# Patient Record
Sex: FEMALE | Race: OTHER | NOT HISPANIC OR LATINO
[De-identification: names, ages, dates, MRNs, and addresses within clinical notes are randomized per-mention and may not be internally consistent; named-entity substitution may affect disease eponyms.]

---

## 2019-08-09 ENCOUNTER — APPOINTMENT (OUTPATIENT)
Dept: ULTRASOUND IMAGING | Facility: HOSPITAL | Age: 64
End: 2019-08-09
Payer: COMMERCIAL

## 2019-08-09 ENCOUNTER — OUTPATIENT (OUTPATIENT)
Dept: OUTPATIENT SERVICES | Facility: HOSPITAL | Age: 64
LOS: 1 days | End: 2019-08-09
Payer: COMMERCIAL

## 2019-08-09 DIAGNOSIS — E04.2 NONTOXIC MULTINODULAR GOITER: ICD-10-CM

## 2019-08-09 DIAGNOSIS — M81.8 OTHER OSTEOPOROSIS WITHOUT CURRENT PATHOLOGICAL FRACTURE: ICD-10-CM

## 2019-08-09 PROCEDURE — 76536 US EXAM OF HEAD AND NECK: CPT | Mod: 26

## 2019-08-09 PROCEDURE — 76536 US EXAM OF HEAD AND NECK: CPT

## 2020-10-12 ENCOUNTER — INPATIENT (INPATIENT)
Facility: HOSPITAL | Age: 65
LOS: 2 days | Discharge: HOME | DRG: 674 | End: 2020-10-15
Attending: UROLOGY | Admitting: UROLOGY
Payer: COMMERCIAL

## 2020-10-12 VITALS
SYSTOLIC BLOOD PRESSURE: 95 MMHG | HEIGHT: 66 IN | HEART RATE: 105 BPM | DIASTOLIC BLOOD PRESSURE: 59 MMHG | RESPIRATION RATE: 18 BRPM | OXYGEN SATURATION: 96 % | WEIGHT: 130.07 LBS | TEMPERATURE: 101 F

## 2020-10-12 PROCEDURE — 93010 ELECTROCARDIOGRAM REPORT: CPT

## 2020-10-12 PROCEDURE — 99291 CRITICAL CARE FIRST HOUR: CPT

## 2020-10-13 DIAGNOSIS — K66.1 HEMOPERITONEUM: ICD-10-CM

## 2020-10-13 LAB
ALBUMIN SERPL ELPH-MCNC: 3.8 G/DL — SIGNIFICANT CHANGE UP (ref 3.3–5)
ALP SERPL-CCNC: 65 U/L — SIGNIFICANT CHANGE UP (ref 40–120)
ALT FLD-CCNC: 19 U/L — SIGNIFICANT CHANGE UP (ref 10–45)
ANION GAP SERPL CALC-SCNC: 13 MMOL/L — SIGNIFICANT CHANGE UP (ref 5–17)
APPEARANCE UR: ABNORMAL
APTT BLD: 26.9 SEC — LOW (ref 27.5–35.5)
AST SERPL-CCNC: 21 U/L — SIGNIFICANT CHANGE UP (ref 10–40)
BACTERIA # UR AUTO: ABNORMAL
BASOPHILS # BLD AUTO: 0 K/UL — SIGNIFICANT CHANGE UP (ref 0–0.2)
BASOPHILS # BLD AUTO: 0.04 K/UL — SIGNIFICANT CHANGE UP (ref 0–0.2)
BASOPHILS NFR BLD AUTO: 0 % — SIGNIFICANT CHANGE UP (ref 0–2)
BASOPHILS NFR BLD AUTO: 0.3 % — SIGNIFICANT CHANGE UP (ref 0–2)
BILIRUB SERPL-MCNC: 0.5 MG/DL — SIGNIFICANT CHANGE UP (ref 0.2–1.2)
BILIRUB UR-MCNC: NEGATIVE — SIGNIFICANT CHANGE UP
BLD GP AB SCN SERPL QL: NEGATIVE — SIGNIFICANT CHANGE UP
BUN SERPL-MCNC: 18 MG/DL — SIGNIFICANT CHANGE UP (ref 7–23)
CALCIUM SERPL-MCNC: 9.4 MG/DL — SIGNIFICANT CHANGE UP (ref 8.4–10.5)
CHLORIDE SERPL-SCNC: 100 MMOL/L — SIGNIFICANT CHANGE UP (ref 96–108)
CO2 SERPL-SCNC: 25 MMOL/L — SIGNIFICANT CHANGE UP (ref 22–31)
COLOR SPEC: ABNORMAL
CREAT SERPL-MCNC: 1.22 MG/DL — SIGNIFICANT CHANGE UP (ref 0.5–1.3)
DIFF PNL FLD: ABNORMAL
EOSINOPHIL # BLD AUTO: 0.03 K/UL — SIGNIFICANT CHANGE UP (ref 0–0.5)
EOSINOPHIL # BLD AUTO: 0.24 K/UL — SIGNIFICANT CHANGE UP (ref 0–0.5)
EOSINOPHIL NFR BLD AUTO: 0.2 % — SIGNIFICANT CHANGE UP (ref 0–6)
EOSINOPHIL NFR BLD AUTO: 1.7 % — SIGNIFICANT CHANGE UP (ref 0–6)
EPI CELLS # UR: 0 /HPF — SIGNIFICANT CHANGE UP
GLUCOSE SERPL-MCNC: 138 MG/DL — HIGH (ref 70–99)
GLUCOSE UR QL: NEGATIVE — SIGNIFICANT CHANGE UP
HCT VFR BLD CALC: 24 % — LOW (ref 34.5–45)
HCT VFR BLD CALC: 27.8 % — LOW (ref 34.5–45)
HCT VFR BLD CALC: 28.1 % — LOW (ref 34.5–45)
HCT VFR BLD CALC: 30.1 % — LOW (ref 34.5–45)
HGB BLD-MCNC: 7.8 G/DL — LOW (ref 11.5–15.5)
HGB BLD-MCNC: 9 G/DL — LOW (ref 11.5–15.5)
HGB BLD-MCNC: 9.1 G/DL — LOW (ref 11.5–15.5)
HGB BLD-MCNC: 9.9 G/DL — LOW (ref 11.5–15.5)
HYALINE CASTS # UR AUTO: 2 /LPF — SIGNIFICANT CHANGE UP (ref 0–7)
IMM GRANULOCYTES NFR BLD AUTO: 0.4 % — SIGNIFICANT CHANGE UP (ref 0–1.5)
INR BLD: 1.16 RATIO — SIGNIFICANT CHANGE UP (ref 0.88–1.16)
KETONES UR-MCNC: SIGNIFICANT CHANGE UP
LACTATE BLDV-MCNC: 1.7 MMOL/L — SIGNIFICANT CHANGE UP (ref 0.7–2)
LEUKOCYTE ESTERASE UR-ACNC: ABNORMAL
LYMPHOCYTES # BLD AUTO: 0.85 K/UL — LOW (ref 1–3.3)
LYMPHOCYTES # BLD AUTO: 14.3 % — SIGNIFICANT CHANGE UP (ref 13–44)
LYMPHOCYTES # BLD AUTO: 2.07 K/UL — SIGNIFICANT CHANGE UP (ref 1–3.3)
LYMPHOCYTES # BLD AUTO: 6.1 % — LOW (ref 13–44)
MANUAL SMEAR VERIFICATION: SIGNIFICANT CHANGE UP
MCHC RBC-ENTMCNC: 30.4 PG — SIGNIFICANT CHANGE UP (ref 27–34)
MCHC RBC-ENTMCNC: 30.5 PG — SIGNIFICANT CHANGE UP (ref 27–34)
MCHC RBC-ENTMCNC: 30.6 PG — SIGNIFICANT CHANGE UP (ref 27–34)
MCHC RBC-ENTMCNC: 30.7 PG — SIGNIFICANT CHANGE UP (ref 27–34)
MCHC RBC-ENTMCNC: 32.4 GM/DL — SIGNIFICANT CHANGE UP (ref 32–36)
MCHC RBC-ENTMCNC: 32.4 GM/DL — SIGNIFICANT CHANGE UP (ref 32–36)
MCHC RBC-ENTMCNC: 32.5 GM/DL — SIGNIFICANT CHANGE UP (ref 32–36)
MCHC RBC-ENTMCNC: 32.9 GM/DL — SIGNIFICANT CHANGE UP (ref 32–36)
MCV RBC AUTO: 93.5 FL — SIGNIFICANT CHANGE UP (ref 80–100)
MCV RBC AUTO: 94 FL — SIGNIFICANT CHANGE UP (ref 80–100)
MCV RBC AUTO: 94.1 FL — SIGNIFICANT CHANGE UP (ref 80–100)
MCV RBC AUTO: 94.2 FL — SIGNIFICANT CHANGE UP (ref 80–100)
MONOCYTES # BLD AUTO: 1.32 K/UL — HIGH (ref 0–0.9)
MONOCYTES # BLD AUTO: 1.41 K/UL — HIGH (ref 0–0.9)
MONOCYTES NFR BLD AUTO: 9.5 % — SIGNIFICANT CHANGE UP (ref 2–14)
MONOCYTES NFR BLD AUTO: 9.7 % — SIGNIFICANT CHANGE UP (ref 2–14)
MYELOCYTES NFR BLD: 0.9 % — HIGH (ref 0–0)
NEUTROPHILS # BLD AUTO: 10.91 K/UL — HIGH (ref 1.8–7.4)
NEUTROPHILS # BLD AUTO: 11.28 K/UL — HIGH (ref 1.8–7.4)
NEUTROPHILS NFR BLD AUTO: 75.1 % — SIGNIFICANT CHANGE UP (ref 43–77)
NEUTROPHILS NFR BLD AUTO: 80.9 % — HIGH (ref 43–77)
NITRITE UR-MCNC: NEGATIVE — SIGNIFICANT CHANGE UP
NRBC # BLD: 0 /100 WBCS — SIGNIFICANT CHANGE UP (ref 0–0)
PH UR: 6.5 — SIGNIFICANT CHANGE UP (ref 5–8)
PLAT MORPH BLD: NORMAL — SIGNIFICANT CHANGE UP
PLATELET # BLD AUTO: 317 K/UL — SIGNIFICANT CHANGE UP (ref 150–400)
PLATELET # BLD AUTO: 335 K/UL — SIGNIFICANT CHANGE UP (ref 150–400)
PLATELET # BLD AUTO: 336 K/UL — SIGNIFICANT CHANGE UP (ref 150–400)
PLATELET # BLD AUTO: 424 K/UL — HIGH (ref 150–400)
POTASSIUM SERPL-MCNC: 3.9 MMOL/L — SIGNIFICANT CHANGE UP (ref 3.5–5.3)
POTASSIUM SERPL-SCNC: 3.9 MMOL/L — SIGNIFICANT CHANGE UP (ref 3.5–5.3)
PROT SERPL-MCNC: 6.6 G/DL — SIGNIFICANT CHANGE UP (ref 6–8.3)
PROT UR-MCNC: ABNORMAL
PROTHROM AB SERPL-ACNC: 13.8 SEC — HIGH (ref 10.6–13.6)
RBC # BLD: 2.55 M/UL — LOW (ref 3.8–5.2)
RBC # BLD: 2.95 M/UL — LOW (ref 3.8–5.2)
RBC # BLD: 2.99 M/UL — LOW (ref 3.8–5.2)
RBC # BLD: 3.22 M/UL — LOW (ref 3.8–5.2)
RBC # FLD: 13.2 % — SIGNIFICANT CHANGE UP (ref 10.3–14.5)
RBC # FLD: 13.8 % — SIGNIFICANT CHANGE UP (ref 10.3–14.5)
RBC # FLD: 14.3 % — SIGNIFICANT CHANGE UP (ref 10.3–14.5)
RBC # FLD: 14.8 % — HIGH (ref 10.3–14.5)
RBC BLD AUTO: SIGNIFICANT CHANGE UP
RBC CASTS # UR COMP ASSIST: >50 /HPF — SIGNIFICANT CHANGE UP (ref 0–4)
RH IG SCN BLD-IMP: POSITIVE — SIGNIFICANT CHANGE UP
SARS-COV-2 RNA SPEC QL NAA+PROBE: SIGNIFICANT CHANGE UP
SODIUM SERPL-SCNC: 138 MMOL/L — SIGNIFICANT CHANGE UP (ref 135–145)
SP GR SPEC: 1.02 — SIGNIFICANT CHANGE UP (ref 1.01–1.02)
UROBILINOGEN FLD QL: NEGATIVE — SIGNIFICANT CHANGE UP
VARIANT LYMPHS # BLD: 0.9 % — SIGNIFICANT CHANGE UP (ref 0–6)
WBC # BLD: 11.8 K/UL — HIGH (ref 3.8–10.5)
WBC # BLD: 13.94 K/UL — HIGH (ref 3.8–10.5)
WBC # BLD: 14.52 K/UL — HIGH (ref 3.8–10.5)
WBC # BLD: 15.81 K/UL — HIGH (ref 3.8–10.5)
WBC # FLD AUTO: 11.8 K/UL — HIGH (ref 3.8–10.5)
WBC # FLD AUTO: 13.94 K/UL — HIGH (ref 3.8–10.5)
WBC # FLD AUTO: 14.52 K/UL — HIGH (ref 3.8–10.5)
WBC # FLD AUTO: 15.81 K/UL — HIGH (ref 3.8–10.5)
WBC UR QL: ABNORMAL

## 2020-10-13 PROCEDURE — 36248 INS CATH ABD/L-EXT ART ADDL: CPT

## 2020-10-13 PROCEDURE — 99222 1ST HOSP IP/OBS MODERATE 55: CPT

## 2020-10-13 PROCEDURE — 36247 INS CATH ABD/L-EXT ART 3RD: CPT

## 2020-10-13 PROCEDURE — 76937 US GUIDE VASCULAR ACCESS: CPT | Mod: 26

## 2020-10-13 PROCEDURE — 51700 IRRIGATION OF BLADDER: CPT

## 2020-10-13 PROCEDURE — 74177 CT ABD & PELVIS W/CONTRAST: CPT | Mod: 26

## 2020-10-13 PROCEDURE — 71045 X-RAY EXAM CHEST 1 VIEW: CPT | Mod: 26

## 2020-10-13 PROCEDURE — 37242 VASC EMBOLIZE/OCCLUDE ARTERY: CPT

## 2020-10-13 RX ORDER — ATORVASTATIN CALCIUM 80 MG/1
10 TABLET, FILM COATED ORAL AT BEDTIME
Refills: 0 | Status: DISCONTINUED | OUTPATIENT
Start: 2020-10-13 | End: 2020-10-15

## 2020-10-13 RX ORDER — PIPERACILLIN AND TAZOBACTAM 4; .5 G/20ML; G/20ML
3.38 INJECTION, POWDER, LYOPHILIZED, FOR SOLUTION INTRAVENOUS EVERY 8 HOURS
Refills: 0 | Status: DISCONTINUED | OUTPATIENT
Start: 2020-10-13 | End: 2020-10-15

## 2020-10-13 RX ORDER — SODIUM CHLORIDE 9 MG/ML
1000 INJECTION INTRAMUSCULAR; INTRAVENOUS; SUBCUTANEOUS ONCE
Refills: 0 | Status: COMPLETED | OUTPATIENT
Start: 2020-10-13 | End: 2020-10-13

## 2020-10-13 RX ORDER — PIPERACILLIN AND TAZOBACTAM 4; .5 G/20ML; G/20ML
3.38 INJECTION, POWDER, LYOPHILIZED, FOR SOLUTION INTRAVENOUS ONCE
Refills: 0 | Status: COMPLETED | OUTPATIENT
Start: 2020-10-13 | End: 2020-10-13

## 2020-10-13 RX ORDER — METOPROLOL TARTRATE 50 MG
50 TABLET ORAL DAILY
Refills: 0 | Status: DISCONTINUED | OUTPATIENT
Start: 2020-10-13 | End: 2020-10-15

## 2020-10-13 RX ORDER — SODIUM CHLORIDE 9 MG/ML
1000 INJECTION INTRAMUSCULAR; INTRAVENOUS; SUBCUTANEOUS
Refills: 0 | Status: DISCONTINUED | OUTPATIENT
Start: 2020-10-13 | End: 2020-10-14

## 2020-10-13 RX ORDER — ASPIRIN/CALCIUM CARB/MAGNESIUM 324 MG
81 TABLET ORAL DAILY
Refills: 0 | Status: DISCONTINUED | OUTPATIENT
Start: 2020-10-13 | End: 2020-10-15

## 2020-10-13 RX ORDER — VANCOMYCIN HCL 1 G
1000 VIAL (EA) INTRAVENOUS ONCE
Refills: 0 | Status: COMPLETED | OUTPATIENT
Start: 2020-10-13 | End: 2020-10-13

## 2020-10-13 RX ADMIN — SODIUM CHLORIDE 100 MILLILITER(S): 9 INJECTION INTRAMUSCULAR; INTRAVENOUS; SUBCUTANEOUS at 13:42

## 2020-10-13 RX ADMIN — PIPERACILLIN AND TAZOBACTAM 25 GRAM(S): 4; .5 INJECTION, POWDER, LYOPHILIZED, FOR SOLUTION INTRAVENOUS at 13:42

## 2020-10-13 RX ADMIN — SODIUM CHLORIDE 1000 MILLILITER(S): 9 INJECTION INTRAMUSCULAR; INTRAVENOUS; SUBCUTANEOUS at 01:38

## 2020-10-13 RX ADMIN — ATORVASTATIN CALCIUM 10 MILLIGRAM(S): 80 TABLET, FILM COATED ORAL at 21:35

## 2020-10-13 RX ADMIN — Medication 250 MILLIGRAM(S): at 06:01

## 2020-10-13 RX ADMIN — PIPERACILLIN AND TAZOBACTAM 25 GRAM(S): 4; .5 INJECTION, POWDER, LYOPHILIZED, FOR SOLUTION INTRAVENOUS at 21:35

## 2020-10-13 RX ADMIN — PIPERACILLIN AND TAZOBACTAM 200 GRAM(S): 4; .5 INJECTION, POWDER, LYOPHILIZED, FOR SOLUTION INTRAVENOUS at 05:28

## 2020-10-13 NOTE — ED ADULT NURSE REASSESSMENT NOTE - NS ED NURSE REASSESS COMMENT FT1
0340: First unit of PRBC's completed. Pt tolerated transfusion well. No reactions noted. VSS.  0420: Pt started on second unit of PRBC's per MD order. 2RNs verifying the pt and blood product, consent in chart, pt is educated for signs and symptoms of adverse reaction, and given call bell.

## 2020-10-13 NOTE — PROGRESS NOTE ADULT - ASSESSMENT
A/P: 63yo Female s/p right renal artery branch embolization    -DVT prophylaxis/OOB  -Incentive spirometry  -Strict I&O's  -Analgesia and antiemetics as needed  -Regular Diet  -AM labs

## 2020-10-13 NOTE — ED PROVIDER NOTE - PROGRESS NOTE DETAILS
Ania JENKINS: Urology has been consulted for further evaluation Ania JENKINS: IR have been paged for consult. [phone], [phone] Ania JENKINS: IR has been consulted and will come evaluate patient

## 2020-10-13 NOTE — ED PROVIDER NOTE - PHYSICAL EXAMINATION
Gen: AAOx3, non-toxic  Head: NCAT  HEENT: EOMI, oral mucosa moist, normal conjunctiva  Lung: CTAB, no respiratory distress, speaking in full sentences  CV: RRR, no murmurs, rubs or gallops  Abd: soft, NTND, no CVA tenderness          Surgical scar present mild ttp without surrounding erythema, edema, drainage  MSK: no visible deformities  Neuro: No focal sensory or motor deficits  Skin: Warm, well perfused, no rash  Psych: normal affect.   ~Aleksandr Jorge M.D. Resident

## 2020-10-13 NOTE — PROGRESS NOTE ADULT - SUBJECTIVE AND OBJECTIVE BOX
Post op Check    Pt seen and examined without complaints. Pain is controlled. Denies SOB/CP/N/V. Pt feeling well.     Vital Signs Last 24 Hrs  T(C): 36.6 (13 Oct 2020 21:00), Max: 38.3 (12 Oct 2020 23:53)  T(F): 97.9 (13 Oct 2020 21:00), Max: 100.9 (12 Oct 2020 23:53)  HR: 74 (13 Oct 2020 21:00) (74 - 105)  BP: 113/60 (13 Oct 2020 21:00) (88/54 - 138/63)  BP(mean): 82 (13 Oct 2020 21:00) (73 - 85)  RR: 16 (13 Oct 2020 21:00) (14 - 22)  SpO2: 99% (13 Oct 2020 21:00) (96% - 100%)    I&O's Summary    12 Oct 2020 07:01  -  13 Oct 2020 07:00  --------------------------------------------------------  IN: 250 mL / OUT: 0 mL / NET: 250 mL    13 Oct 2020 07:01  -  13 Oct 2020 21:31  --------------------------------------------------------  IN: 100 mL / OUT: 1500 mL / NET: -1400 mL    I&O's Detail    12 Oct 2020 07:01  -  13 Oct 2020 07:00  --------------------------------------------------------  IN:    IV PiggyBack: 250 mL  Total IN: 250 mL    OUT:    Oral Fluid: 0 mL  Total OUT: 0 mL    Total NET: 250 mL      13 Oct 2020 07:01  -  13 Oct 2020 21:31  --------------------------------------------------------  IN:    sodium chloride 0.9%: 100 mL  Total IN: 100 mL    OUT:    Indwelling Catheter - Urethral (mL): 1500 mL    Oral Fluid: 0 mL  Total OUT: 1500 mL    Total NET: -1400 mL          Physical Exam  Gen: awake alert NAD AXOX3  Pulm: No respiratory distress  Abd: Soft, NT, ND  right upper abdomen dressing C/D/I, hematoma improved   : right groin nontender, no bleeding or ecchymosis  goldsmith in place draining clear light pink urine                           9.0    11.80 )-----------( 336      ( 13 Oct 2020 20:31 )             27.8       10-13    138  |  100  |  18  ----------------------------<  138<H>  3.9   |  25  |  1.22    Ca    9.4      13 Oct 2020 00:43    TPro  6.6  /  Alb  3.8  /  TBili  0.5  /  DBili  x   /  AST  21  /  ALT  19  /  AlkPhos  65  10-13

## 2020-10-13 NOTE — ED PROVIDER NOTE - CLINICAL SUMMARY MEDICAL DECISION MAKING FREE TEXT BOX
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 Will get ekg, pre op labs, type and screen, UA, CT abd/pelvis to evaluate for post surgical changes and reassess

## 2020-10-13 NOTE — H&P ADULT - NSHPLABSRESULTS_GEN_ALL_CORE
Lab Results:  CBC  CBC Full  -  ( 13 Oct 2020 00:43 )  WBC Count : 13.94 K/uL  RBC Count : 2.55 M/uL  Hemoglobin : 7.8 g/dL  Hematocrit : 24.0 %  Platelet Count - Automated : 424 K/uL  Mean Cell Volume : 94.1 fl  Mean Cell Hemoglobin : 30.6 pg  Mean Cell Hemoglobin Concentration : 32.5 gm/dL  Auto Neutrophil # : 11.28 K/uL  Auto Lymphocyte # : 0.85 K/uL  Auto Monocyte # : 1.32 K/uL  Auto Eosinophil # : 0.24 K/uL  Auto Basophil # : 0.00 K/uL  Auto Neutrophil % : 80.9 %  Auto Lymphocyte % : 6.1 %  Auto Monocyte % : 9.5 %  Auto Eosinophil % : 1.7 %  Auto Basophil % : 0.0 %    .		Differential:	[] Automated		[] Manual  Chemistry  10-13    138  |  100  |  18  ----------------------------<  138<H>  3.9   |  25  |  1.22    Ca    9.4      13 Oct 2020 00:43    TPro  6.6  /  Alb  3.8  /  TBili  0.5  /  DBili  x   /  AST  21  /  ALT  19  /  AlkPhos  65  10-13    LIVER FUNCTIONS - ( 13 Oct 2020 00:43 )  Alb: 3.8 g/dL / Pro: 6.6 g/dL / ALK PHOS: 65 U/L / ALT: 19 U/L / AST: 21 U/L / GGT: x           PT/INR - ( 13 Oct 2020 00:43 )   PT: 13.8 sec;   INR: 1.16 ratio         PTT - ( 13 Oct 2020 00:43 )  PTT:26.9 sec  Urinalysis Basic - ( 13 Oct 2020 01:55 )    Color: Red / Appearance: Turbid / S.017 / pH: x  Gluc: x / Ketone: Trace  / Bili: Negative / Urobili: Negative   Blood: x / Protein: 100 mg/dL / Nitrite: Negative   Leuk Esterase: Moderate / RBC: >50 /hpf / WBC 11-25   Sq Epi: x / Non Sq Epi: 0 /hpf / Bacteria: Few        MICROBIOLOGY/CULTURES:    RADIOLOGY RESULTS:

## 2020-10-13 NOTE — ED ADULT NURSE NOTE - NSIMPLEMENTINTERV_GEN_ALL_ED
Implemented All Universal Safety Interventions:  [place] to call system. Call bell, personal items and telephone within reach. Instruct patient to call for assistance. Room bathroom lighting operational. Non-slip footwear when patient is off stretcher. Physically safe environment: no spills, clutter or unnecessary equipment. Stretcher in lowest position, wheels locked, appropriate side rails in place.

## 2020-10-13 NOTE — ED PROVIDER NOTE - NS ED ROS FT
GENERAL: No fever or chills, EYES: no change in vision, HEENT: no trouble swallowing or speaking, CARDIAC: no chest pain, PULMONARY: no cough or SOB, GI: no abdominal pain, no nausea, no vomiting, no diarrhea or constipation, : Hematuria, SKIN: no rashes, NEURO: no headache,  MSK: No joint pain ~Aleksandr Jorge M.D. Resident

## 2020-10-13 NOTE — ED ADULT NURSE REASSESSMENT NOTE - NS ED NURSE REASSESS COMMENT FT1
15 min vital signs s/p second PRBC infusion completed. ED MD Alberto made aware of increased in temperature. Pt denies CP, SOB, N/V, increase in abdominal pain/backpain, rashes, itching, chills. Pt to be cont on second unit of PRBC's and will cont to monitor pt and recheck VS in 15min.

## 2020-10-13 NOTE — ED ADULT NURSE REASSESSMENT NOTE - NS ED NURSE REASSESS COMMENT FT1
Haskins placed using sterile technique. Two Rn present, sterile technique verified. No resistence felt. Bright red blood output of 250cc. MD Tao is aware.

## 2020-10-13 NOTE — PROCEDURE NOTE - PLAN
-keep right leg straight 4 hours  -Please call interventional radiology at [phone] during normal business hours, or [phone] and page [phone] during call hours or weekends with any questions, concerns or issues.

## 2020-10-13 NOTE — H&P ADULT - NSHPPHYSICALEXAM_GEN_ALL_CORE
General: weakness  Skin: warm, no cyanosis   HEENT: EOM intact, No epistaxis, No icterus   Heart: RRR, S1 and S2 normal   Lung: CTA BL   Abdo: Soft, RLQ tenderness and form, non-distended,    Back: R CVAT   Extremity: No calf tenderness, No edema   : Haskins in place, draining pink urine   Neuro: A&O x3

## 2020-10-13 NOTE — ED PROVIDER NOTE - ATTENDING CONTRIBUTION TO CARE
pt 3 weeks post-op s/p partial nephrectomy on R kidney for tumor of unclear etiology, here with 1 day of hematuria and few hrs of fever. mild amt of R flank pain. on arrival, pt is well appearing, febrile, low but stable BP. soft non-tender abd although with some firmness around surgical site. wound looks to be healing well, no erythema, induration. labs show h/h of 7.8 (unclear baseline). pt's BP dropped thus she was given emergency release blood with rapid improvement of sx. CT shows concern for infected hematoma. urology consulted. IR to be consulted as well (awaiting call back). will continue to transfuse PRN. pt will require admission for possible drainage of hematoma. awaiting recs from both urology and IR.

## 2020-10-13 NOTE — CHART NOTE - NSCHARTNOTEFT_GEN_A_CORE
pt had 16fr goldsmith in, draining light pink. removed the goldsmith, 20Fr 6-eye goldsmith placed pink colored urine drained. Have difficulty Irrigated, only few clot return. unable to place a 3way,  Replaced with 18Fr with 10cc in balloon. Procedure done under sterile technique and Goldsmith secured with stat lock. Pt tolerated well.

## 2020-10-13 NOTE — PROCEDURE NOTE - PROCEDURE FINDINGS AND DETAILS
-pseudoaneurysm arising from a lower pole branch of the right renal artery  -embolization to flow arrest with 0.5cc 33% nBCA glue

## 2020-10-13 NOTE — CONSULT NOTE ADULT - SUBJECTIVE AND OBJECTIVE BOX
Assessment: This is a 65y/o female s/p Partial R nephrectomy 3wks ago (Dr. Tl Ferrell at [facility]) c/b hematoma in the right renal surgical bed. IR consulted for Right renal angiogram.     < from: CT Abdomen and Pelvis w/ IV Cont (10.13.20 @ 02:31) >  IMPRESSION:  Status post partial right nephrectomy. Complex, heterogeneously attenuating and multiloculated rim enhancing right retroperitoneal fluid collection, partially surrounding the right kidney and renal pelvis, as described, most concerning for infected hematoma. Please note that collecting system or vascular injury is not excluded on this single phase CT scan.  Partially distended urinary bladder, status post Haskins placement. There is blood clot within the bladder lumen. Recommended clinical correlation to assess malfunctioning or obstruction of the catheter.  Indeterminate exophytic left renal lesion measuring 3.8 x 3.3 cm, possibly a complex, multiseptated versus hemorrhagic cyst, recommend further workup with nonemergent renal protocol MRI.    Plan:  -Plan for IR intervention- Right Renal Angiogram today  -Please place order for IR Procedure, approving attending Dr. Lou  -Keep patient NPO  -hold therapeutic and prophylactic anticoagulants  -maintain active type and screen x 2  -discussed with urology DESTINY Cuadra    Please call IR at extension [phone] with any questions, concerns, or issues regarding above.      Barbie Tenorio, NICOLE-BC  Spectra # [phone]

## 2020-10-13 NOTE — H&P ADULT - HISTORY OF PRESENT ILLNESS
pt 65y/o F s/p Partial R nephrectomy 3wks ago (Dr. Tl Ferrell at [facility]). come to ED today c/o hematuria and dysuria since yesterday morning, pt state post surgery she been doing well, normal yellow urine until early yesterday, She start has hematuria, pink color, no clot. Few hours later start has difficulty voiding. Then tonight pt start feel fever and chill. then come to ED. pt also c/o R flank pain, Denies N/C. CT done show hematoma on the R renal site.

## 2020-10-13 NOTE — PRE PROCEDURE NOTE - PRE PROCEDURE EVALUATION
------------------------------------------------------------  Interventional Radiology Pre-Procedure Note  ------------------------------------------------------------    Indication: 64y Female s/p right partial nephrectomy now with right perirenal/retroperitoneal hematoma is referred for angiography and possible embolization.    Past Medical History:      Allergies: No Known Allergies      Medications:    piperacillin/tazobactam IVPB..: 25 mL/Hr IV Intermittent (10-13-20 @ 13:42)  piperacillin/tazobactam IVPB...: 200 mL/Hr IV Intermittent (10-13-20 @ 05:28)  vancomycin  IVPB.: 250 mL/Hr IV Intermittent (10-13-20 @ 06:01)      Vital Signs:   T(F): 98.4 (16:20), Max: 100.9 (23:53)  HR: 80 (16:20)  BP: 112/64 (16:20)  RR: 18 (16:20)  SpO2: 98% (16:20)    Labs:           9.9  15.81)-----(335     (10-13-20 @ 10:52)         30.1     138 | 100 | 18  --------------------< 138     (10-13-20 @ 00:43)  3.9 | 25 | 1.22       PT: 13.8<H> 10-13-20 @ 00:43  aPTT: 26.9<L> 10-13-20 @ 00:43   INR: 1.16 10-13-20 @ 00:43    Imaging: CT shows right sided perirenal and retroperitoneal hemorrhage and a small right renal artery pseudoaneurysm.    Consent: The risks, benefits and alternatives of the procedure were discussed with the patient, who verbalized understanding. Signed consent is available in the paper chart.    Procedure Plan: right renal angiogram, possible embolization    Lalo Jessica MD, VI  Chief Resident, Interventional Radiology  [facility]: [phone] (p) [phone]  [facility]: [phone]

## 2020-10-13 NOTE — PRE-ANESTHESIA EVALUATION ADULT - NSANTHOSAYNRD_GEN_A_CORE
No. YENI screening performed.  STOP BANG Legend: 0-2 = LOW Risk; 3-4 = INTERMEDIATE Risk; 5-8 = HIGH Risk

## 2020-10-13 NOTE — ED PROVIDER NOTE - OBJECTIVE STATEMENT
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 Patient presented due fever few hours prior to presentation. No abd pain, back pain, nausea, vomiting.

## 2020-10-13 NOTE — H&P ADULT - ASSESSMENT
pt 65y/o F s/p Partial R nephrectomy 3wks ago (Dr. Tl Ferrell at [facility]). come to ED today c/o hematuria and dysuria since yesterday morning, pt state post surgery she been doing well, normal yellow urine until early yesterday, She start has hematuria, pink color, no clot. Few hours later start has difficulty voiding. Then tonight pt start feel fever and chill. then come to ED. pt also c/o R flank pain, Denies N/C. CT done show hematoma on the R renal site. At ED pt F 100.9 low BP. Getting 2U pRBC. 6 eye and hand irrigation done at bed, unable to get clot out.    plan  admitted to    NPO/IV  IR   pt 63y/o F s/p Partial R nephrectomy 3wks ago (Dr. Tl Ferrell at [facility]). come to ED today c/o hematuria and dysuria since yesterday morning, pt state post surgery she been doing well, normal yellow urine until early yesterday, She start has hematuria, pink color, no clot. Few hours later start has difficulty voiding. Then tonight pt start feel fever and chill. then come to ED. pt also c/o R flank pain, Denies N/C. CT done show hematoma on the R renal site. At ED pt F 100.9 low BP. Getting 2U pRBC. 6 eye and hand irrigation done at bed, unable to get clot out.    plan  admitted to    NPO/IVF  IR consult for angio +/- embolization  vanc/zosyn  Followup cultures  Monitor vitals

## 2020-10-14 LAB
ANION GAP SERPL CALC-SCNC: 10 MMOL/L — SIGNIFICANT CHANGE UP (ref 5–17)
BUN SERPL-MCNC: 9 MG/DL — SIGNIFICANT CHANGE UP (ref 7–23)
CALCIUM SERPL-MCNC: 8.5 MG/DL — SIGNIFICANT CHANGE UP (ref 8.4–10.5)
CHLORIDE SERPL-SCNC: 104 MMOL/L — SIGNIFICANT CHANGE UP (ref 96–108)
CO2 SERPL-SCNC: 26 MMOL/L — SIGNIFICANT CHANGE UP (ref 22–31)
CREAT SERPL-MCNC: 0.73 MG/DL — SIGNIFICANT CHANGE UP (ref 0.5–1.3)
CULTURE RESULTS: SIGNIFICANT CHANGE UP
GLUCOSE SERPL-MCNC: 138 MG/DL — HIGH (ref 70–99)
HCT VFR BLD CALC: 30.2 % — LOW (ref 34.5–45)
HCV AB S/CO SERPL IA: 0.08 S/CO — SIGNIFICANT CHANGE UP (ref 0–0.99)
HCV AB SERPL-IMP: SIGNIFICANT CHANGE UP
HGB BLD-MCNC: 9.9 G/DL — LOW (ref 11.5–15.5)
MCHC RBC-ENTMCNC: 31 PG — SIGNIFICANT CHANGE UP (ref 27–34)
MCHC RBC-ENTMCNC: 32.8 GM/DL — SIGNIFICANT CHANGE UP (ref 32–36)
MCV RBC AUTO: 94.7 FL — SIGNIFICANT CHANGE UP (ref 80–100)
NRBC # BLD: 0 /100 WBCS — SIGNIFICANT CHANGE UP (ref 0–0)
PLATELET # BLD AUTO: 381 K/UL — SIGNIFICANT CHANGE UP (ref 150–400)
POTASSIUM SERPL-MCNC: 3.4 MMOL/L — LOW (ref 3.5–5.3)
POTASSIUM SERPL-SCNC: 3.4 MMOL/L — LOW (ref 3.5–5.3)
RBC # BLD: 3.19 M/UL — LOW (ref 3.8–5.2)
RBC # FLD: 14.7 % — HIGH (ref 10.3–14.5)
SARS-COV-2 IGG SERPL QL IA: POSITIVE
SARS-COV-2 IGM SERPL IA-ACNC: 211 AU/ML — HIGH
SODIUM SERPL-SCNC: 140 MMOL/L — SIGNIFICANT CHANGE UP (ref 135–145)
SPECIMEN SOURCE: SIGNIFICANT CHANGE UP
WBC # BLD: 11.03 K/UL — HIGH (ref 3.8–10.5)
WBC # FLD AUTO: 11.03 K/UL — HIGH (ref 3.8–10.5)

## 2020-10-14 PROCEDURE — 99232 SBSQ HOSP IP/OBS MODERATE 35: CPT

## 2020-10-14 RX ORDER — SODIUM CHLORIDE 9 MG/ML
1000 INJECTION, SOLUTION INTRAVENOUS
Refills: 0 | Status: DISCONTINUED | OUTPATIENT
Start: 2020-10-14 | End: 2020-10-15

## 2020-10-14 RX ORDER — METOPROLOL TARTRATE 50 MG
1 TABLET ORAL
Qty: 0 | Refills: 0 | DISCHARGE

## 2020-10-14 RX ORDER — ASPIRIN/CALCIUM CARB/MAGNESIUM 324 MG
1 TABLET ORAL
Qty: 0 | Refills: 0 | DISCHARGE

## 2020-10-14 RX ORDER — MORPHINE SULFATE 50 MG/1
2 CAPSULE, EXTENDED RELEASE ORAL ONCE
Refills: 0 | Status: DISCONTINUED | OUTPATIENT
Start: 2020-10-14 | End: 2020-10-14

## 2020-10-14 RX ORDER — ATORVASTATIN CALCIUM 80 MG/1
1 TABLET, FILM COATED ORAL
Qty: 0 | Refills: 0 | DISCHARGE

## 2020-10-14 RX ORDER — POTASSIUM CHLORIDE 20 MEQ
20 PACKET (EA) ORAL
Refills: 0 | Status: COMPLETED | OUTPATIENT
Start: 2020-10-14 | End: 2020-10-14

## 2020-10-14 RX ADMIN — PIPERACILLIN AND TAZOBACTAM 25 GRAM(S): 4; .5 INJECTION, POWDER, LYOPHILIZED, FOR SOLUTION INTRAVENOUS at 21:02

## 2020-10-14 RX ADMIN — Medication 20 MILLIEQUIVALENT(S): at 16:11

## 2020-10-14 RX ADMIN — PIPERACILLIN AND TAZOBACTAM 25 GRAM(S): 4; .5 INJECTION, POWDER, LYOPHILIZED, FOR SOLUTION INTRAVENOUS at 05:31

## 2020-10-14 RX ADMIN — SODIUM CHLORIDE 75 MILLILITER(S): 9 INJECTION, SOLUTION INTRAVENOUS at 21:03

## 2020-10-14 RX ADMIN — SODIUM CHLORIDE 75 MILLILITER(S): 9 INJECTION, SOLUTION INTRAVENOUS at 08:11

## 2020-10-14 RX ADMIN — Medication 20 MILLIEQUIVALENT(S): at 11:25

## 2020-10-14 RX ADMIN — Medication 50 MILLIGRAM(S): at 05:31

## 2020-10-14 RX ADMIN — Medication 81 MILLIGRAM(S): at 11:26

## 2020-10-14 RX ADMIN — MORPHINE SULFATE 2 MILLIGRAM(S): 50 CAPSULE, EXTENDED RELEASE ORAL at 10:24

## 2020-10-14 RX ADMIN — PIPERACILLIN AND TAZOBACTAM 25 GRAM(S): 4; .5 INJECTION, POWDER, LYOPHILIZED, FOR SOLUTION INTRAVENOUS at 13:16

## 2020-10-14 RX ADMIN — Medication 20 MILLIEQUIVALENT(S): at 13:15

## 2020-10-14 RX ADMIN — ATORVASTATIN CALCIUM 10 MILLIGRAM(S): 80 TABLET, FILM COATED ORAL at 21:03

## 2020-10-14 RX ADMIN — MORPHINE SULFATE 2 MILLIGRAM(S): 50 CAPSULE, EXTENDED RELEASE ORAL at 10:54

## 2020-10-14 NOTE — PROVIDER CONTACT NOTE (OTHER) - ACTION/TREATMENT ORDERED:
No intervention at this time, pt has large clot in bladder, unable to irrigate previously. Team will intervene during the day.

## 2020-10-14 NOTE — PROGRESS NOTE ADULT - SUBJECTIVE AND OBJECTIVE BOX
The patient was seen and examined at bedside.  She states that urine has been leaking around the catheter overnight.  Denies complaints of chest pain, shortness of breath, nausea, acute pain.    T(C): 36.4 (10-14-20 @ 05:14), Max: 37.4 (10-13-20 @ 20:00)  HR: 79 (10-14-20 @ 05:14) (74 - 91)  BP: 100/62 (10-14-20 @ 05:14) (100/60 - 138/63)  RR: 18 (10-14-20 @ 05:14) (14 - 18)  SpO2: 96% (10-14-20 @ 05:14) (95% - 100%)  Wt(kg): --    Physical Exam:    General: NAD, A+Ox3  Abdomen: soft, non-tender, non-distended        10-13 @ 07:01  -  10-14 @ 07:00  --------------------------------------------------------  IN: 1540 mL / OUT: 1650 mL / NET: -110 mL      Haskins - 500cc clear

## 2020-10-14 NOTE — PROVIDER CONTACT NOTE (OTHER) - BACKGROUND
Pt admitted for hematuria, dysuria x2 days, was s/p right partial nephrectomy 3 weeks ago at [facility].

## 2020-10-14 NOTE — PROVIDER CONTACT NOTE (OTHER) - ASSESSMENT
Pt's urine is leaking in large amount around goldsmith, unable to obtain in goldsmith canister. Vitals stable, denies of pain or discomfort. Dressing to right groin C/D/I.

## 2020-10-14 NOTE — PROGRESS NOTE ADULT - SUBJECTIVE AND OBJECTIVE BOX
-------------------------------------------------------  Interventional Radiology Follow-Up Note  -------------------------------------------------------    Procedure: right renal artery branch embolization, postop day 1    Interval Events: Patient is feeling well this AM without complaints.    Vital Signs:    T(C): 36.4 (10-14-20 @ 05:14), Max: 37.4 (10-13-20 @ 20:00)  HR: 79 (10-14-20 @ 05:14) (74 - 91)  BP: 100/62 (10-14-20 @ 05:14) (100/60 - 138/63)  RR: 18 (10-14-20 @ 05:14) (14 - 18)  SpO2: 96% (10-14-20 @ 05:14) (95% - 100%)    Labs:           9.9  11.03)-----(381     (10-14-20 @ 07:16)         30.2     140 | 104 | 9  --------------------< 138     (10-14-20 @ 07:16)  3.4 | 26 | 0.73       PT: 13.8<H> 10-13-20 @ 00:43  aPTT: 26.9<L> 10-13-20 @ 00:43   INR: 1.16 10-13-20 @ 00:43    Impression: POD#1 following right renal artery branch embolization. There is residual bladder clot however CBC remains stable today--no evidence of further bleeding following embolization.    Recommendations:  -trend CBC  -bladder irrigation per urology recs  -Please call interventional radiology at [phone] during normal business hours, or [phone] and page [phone] during call hours or weekends with any questions, concerns or issues.      Lalo Jessica MD, RPVI  Chief Resident, Interventional Radiology  [facility]: [phone] (p) [phone]  [facility]: [phone]

## 2020-10-15 ENCOUNTER — TRANSCRIPTION ENCOUNTER (OUTPATIENT)
Age: 65
End: 2020-10-15

## 2020-10-15 VITALS
SYSTOLIC BLOOD PRESSURE: 132 MMHG | RESPIRATION RATE: 18 BRPM | HEART RATE: 86 BPM | DIASTOLIC BLOOD PRESSURE: 73 MMHG | TEMPERATURE: 99 F | OXYGEN SATURATION: 98 %

## 2020-10-15 LAB
ANION GAP SERPL CALC-SCNC: 9 MMOL/L — SIGNIFICANT CHANGE UP (ref 5–17)
BUN SERPL-MCNC: 8 MG/DL — SIGNIFICANT CHANGE UP (ref 7–23)
CALCIUM SERPL-MCNC: 8.8 MG/DL — SIGNIFICANT CHANGE UP (ref 8.4–10.5)
CHLORIDE SERPL-SCNC: 107 MMOL/L — SIGNIFICANT CHANGE UP (ref 96–108)
CO2 SERPL-SCNC: 27 MMOL/L — SIGNIFICANT CHANGE UP (ref 22–31)
CREAT SERPL-MCNC: 0.74 MG/DL — SIGNIFICANT CHANGE UP (ref 0.5–1.3)
GLUCOSE SERPL-MCNC: 110 MG/DL — HIGH (ref 70–99)
HCT VFR BLD CALC: 29.6 % — LOW (ref 34.5–45)
HGB BLD-MCNC: 9.5 G/DL — LOW (ref 11.5–15.5)
MCHC RBC-ENTMCNC: 30.6 PG — SIGNIFICANT CHANGE UP (ref 27–34)
MCHC RBC-ENTMCNC: 32.1 GM/DL — SIGNIFICANT CHANGE UP (ref 32–36)
MCV RBC AUTO: 95.5 FL — SIGNIFICANT CHANGE UP (ref 80–100)
NRBC # BLD: 0 /100 WBCS — SIGNIFICANT CHANGE UP (ref 0–0)
PLATELET # BLD AUTO: 409 K/UL — HIGH (ref 150–400)
POTASSIUM SERPL-MCNC: 3.9 MMOL/L — SIGNIFICANT CHANGE UP (ref 3.5–5.3)
POTASSIUM SERPL-SCNC: 3.9 MMOL/L — SIGNIFICANT CHANGE UP (ref 3.5–5.3)
RBC # BLD: 3.1 M/UL — LOW (ref 3.8–5.2)
RBC # FLD: 14.1 % — SIGNIFICANT CHANGE UP (ref 10.3–14.5)
SODIUM SERPL-SCNC: 143 MMOL/L — SIGNIFICANT CHANGE UP (ref 135–145)
WBC # BLD: 8.65 K/UL — SIGNIFICANT CHANGE UP (ref 3.8–10.5)
WBC # FLD AUTO: 8.65 K/UL — SIGNIFICANT CHANGE UP (ref 3.8–10.5)

## 2020-10-15 PROCEDURE — 86850 RBC ANTIBODY SCREEN: CPT

## 2020-10-15 PROCEDURE — 93005 ELECTROCARDIOGRAM TRACING: CPT

## 2020-10-15 PROCEDURE — P9040: CPT

## 2020-10-15 PROCEDURE — 80048 BASIC METABOLIC PNL TOTAL CA: CPT

## 2020-10-15 PROCEDURE — 80053 COMPREHEN METABOLIC PANEL: CPT

## 2020-10-15 PROCEDURE — 86923 COMPATIBILITY TEST ELECTRIC: CPT

## 2020-10-15 PROCEDURE — 87040 BLOOD CULTURE FOR BACTERIA: CPT

## 2020-10-15 PROCEDURE — 87635 SARS-COV-2 COVID-19 AMP PRB: CPT

## 2020-10-15 PROCEDURE — 85027 COMPLETE CBC AUTOMATED: CPT

## 2020-10-15 PROCEDURE — 71045 X-RAY EXAM CHEST 1 VIEW: CPT

## 2020-10-15 PROCEDURE — P9016: CPT

## 2020-10-15 PROCEDURE — 74177 CT ABD & PELVIS W/CONTRAST: CPT

## 2020-10-15 PROCEDURE — 36430 TRANSFUSION BLD/BLD COMPNT: CPT

## 2020-10-15 PROCEDURE — 96374 THER/PROPH/DIAG INJ IV PUSH: CPT | Mod: XU

## 2020-10-15 PROCEDURE — 85025 COMPLETE CBC W/AUTO DIFF WBC: CPT

## 2020-10-15 PROCEDURE — 86803 HEPATITIS C AB TEST: CPT

## 2020-10-15 PROCEDURE — 37242 VASC EMBOLIZE/OCCLUDE ARTERY: CPT

## 2020-10-15 PROCEDURE — 87086 URINE CULTURE/COLONY COUNT: CPT

## 2020-10-15 PROCEDURE — 99238 HOSP IP/OBS DSCHRG MGMT 30/<: CPT

## 2020-10-15 PROCEDURE — C1887: CPT

## 2020-10-15 PROCEDURE — 81001 URINALYSIS AUTO W/SCOPE: CPT

## 2020-10-15 PROCEDURE — C1760: CPT

## 2020-10-15 PROCEDURE — 86901 BLOOD TYPING SEROLOGIC RH(D): CPT

## 2020-10-15 PROCEDURE — 36247 INS CATH ABD/L-EXT ART 3RD: CPT

## 2020-10-15 PROCEDURE — C1769: CPT

## 2020-10-15 PROCEDURE — 99231 SBSQ HOSP IP/OBS SF/LOW 25: CPT

## 2020-10-15 PROCEDURE — 83605 ASSAY OF LACTIC ACID: CPT

## 2020-10-15 PROCEDURE — 36248 INS CATH ABD/L-EXT ART ADDL: CPT | Mod: 59

## 2020-10-15 PROCEDURE — 76937 US GUIDE VASCULAR ACCESS: CPT

## 2020-10-15 PROCEDURE — 86900 BLOOD TYPING SEROLOGIC ABO: CPT

## 2020-10-15 PROCEDURE — 99291 CRITICAL CARE FIRST HOUR: CPT | Mod: 25

## 2020-10-15 PROCEDURE — 86769 SARS-COV-2 COVID-19 ANTIBODY: CPT

## 2020-10-15 PROCEDURE — 85730 THROMBOPLASTIN TIME PARTIAL: CPT

## 2020-10-15 PROCEDURE — 85610 PROTHROMBIN TIME: CPT

## 2020-10-15 PROCEDURE — C1894: CPT

## 2020-10-15 RX ADMIN — Medication 81 MILLIGRAM(S): at 12:28

## 2020-10-15 RX ADMIN — Medication 50 MILLIGRAM(S): at 05:08

## 2020-10-15 RX ADMIN — SODIUM CHLORIDE 75 MILLILITER(S): 9 INJECTION, SOLUTION INTRAVENOUS at 05:09

## 2020-10-15 RX ADMIN — PIPERACILLIN AND TAZOBACTAM 25 GRAM(S): 4; .5 INJECTION, POWDER, LYOPHILIZED, FOR SOLUTION INTRAVENOUS at 05:08

## 2020-10-15 NOTE — PROGRESS NOTE ADULT - ASSESSMENT
64 year old female admitted with hematuria after a right partial nephrectomy, s/p IR embolization of a pseudoaneurysm    -goldsmith removed, PVR  -AM labs  -monitor I's and O's  -OOB/DVT prophylaxis  -discharge planning today pending labs and TOV

## 2020-10-15 NOTE — DISCHARGE NOTE PROVIDER - NSDCCPCAREPLAN_GEN_ALL_CORE_FT
PRINCIPAL DISCHARGE DIAGNOSIS  Diagnosis: Retroperitoneal hematoma  Assessment and Plan of Treatment: due to right renal pseudoaneurysm which required embolization  you required a blood transfusion due to anemia from bleeding   Call the office if you experience fever, chills, uncontrolled pain, the inability to tolerate liquids, or the urine does not flow   to promote wound healing do not take a bath, continue to walk frequently, return to daily living activities slowly, no heavy lifting greater than 10lbs for 4-6 weeks, follow up Dr Ferrell or Dr. Anderson ( who saw you in [facility])         SECONDARY DISCHARGE DIAGNOSES  Diagnosis: Hematuria  Assessment and Plan of Treatment: due to bleeding kidney

## 2020-10-15 NOTE — DISCHARGE NOTE PROVIDER - CARE PROVIDER_API CALL
Alonzo Anderson  UROLOGY  [address] [place] [address], [place] [address]  Phone: [phone]  Fax: [phone]  Follow Up Time:

## 2020-10-15 NOTE — DISCHARGE NOTE PROVIDER - NSDCMRMEDTOKEN_GEN_ALL_CORE_FT
Aspir 81 oral delayed release tablet: 1 tab(s) orally once a day  Lipitor 10 mg oral tablet: 1 tab(s) orally once a day  metoprolol succinate 50 mg oral capsule, extended release: 1 cap(s) orally once a day

## 2020-10-15 NOTE — DISCHARGE NOTE NURSING/CASE MANAGEMENT/SOCIAL WORK - PATIENT PORTAL LINK FT
You can access the FollowMyHealth Patient Portal offered by [facility] by registering at the following website: [email or web address]. By joining [facility] [facility]’s FollowMyHealth portal, you will also be able to view your health information using other applications (apps) compatible with our system.

## 2020-10-15 NOTE — PROGRESS NOTE ADULT - REASON FOR ADMISSION
hematuria s/p Partial R nephrectomy

## 2020-10-15 NOTE — PROGRESS NOTE ADULT - SUBJECTIVE AND OBJECTIVE BOX
The patient was seen and examined at bedside.  Denies complaints of chest pain, shortness of breath, nausea, acute pain.    T(C): 37.2 (10-15-20 @ 05:08), Max: 37.2 (10-15-20 @ 05:08)  HR: 69 (10-15-20 @ 05:08) (69 - 81)  BP: 108/65 (10-15-20 @ 05:08) (93/56 - 131/73)  RR: 18 (10-15-20 @ 05:08) (16 - 18)  SpO2: 98% (10-15-20 @ 05:08) (97% - 99%)  Wt(kg): --    Physical Exam:    General: NAD, A+Ox3  Abdomen: soft, non-tender, non-distended        10-14 @ 07:01  -  10-15 @ 07:00  --------------------------------------------------------  IN: 4205 mL / OUT: 3500 mL / NET: 705 mL      Haskins - 2300cc clear    void - 200cc

## 2020-10-15 NOTE — DISCHARGE NOTE PROVIDER - HOSPITAL COURSE
Pt is a 63 yo f with a pmh of right partial nephrectomy three weeks ago at [facility] with Dr. Ferrell who arrived at [facility] on 10/13/2020  with acute gross hematuria and right flank pain. Labs showed acute blood loss anemia due to acute perinephric hematoma. She was febrile   in the ER, cx were negative and she was without further fever.  Pt was admitted, got 2 uprbc and required IR embolization of a renal pseudoaneurysm which was the cause of her bleeding.   Post procedure, labs and vitals remained stable. Bladder was irrigated and all clots were evacuated. CBI was held and   she underwent successful TOV  She was dcd in stable condition

## 2020-10-15 NOTE — PROGRESS NOTE ADULT - SUBJECTIVE AND OBJECTIVE BOX
Interventional Radiology Follow- Up Note      This is a 64y Female s/p right renal angiogram and embolization on 10/13 in Interventional Radiology with Dr. Phillips.     Patient seen and examined @ bedside. Patient resting comfortably in bed. Ambulating OOB to bathroom, tolerating PO diet. No other complaints offered.     Vitals: T(F): 98.9 (10-15-20 @ 05:08), Max: 98.9 (10-15-20 @ 05:08)  HR: 69 (10-15-20 @ 05:08) (69 - 81)  BP: 108/65 (10-15-20 @ 05:08) (93/56 - 131/73)  RR: 18 (10-15-20 @ 05:08) (16 - 18)  SpO2: 98% (10-15-20 @ 05:08) (97% - 99%)  Wt(kg): --    LABS:                        9.5    8.65  )-----------( 409      ( 15 Oct 2020 07:10 )             29.6     10-15    143  |  107  |  8   ----------------------------<  110<H>  3.9   |  27  |  0.74    Ca    8.8      15 Oct 2020 07:10      PHYSICAL EXAM:  General: Nontoxic, in NAD, A&O x3  Abdomen: soft, NTND  Extremities: right groin clean, dry and intact, soft with no evidence of hematoma, b/l femoral/dp/pt pulses +2. No pedal edema or calf tenderness noted; dressing removed.      Assessment/Plan:    Impression: This is a 64 year old female admitted with hematuria after a right partial nephrectomy, now s/p IR embolization of a pseudoaneurysm.    -encourage ambulation   -advance diet as tolerated  -monitor h/h; transfuse as needed  -TOV- goldsmith removed this AM as per urology  -IR signing off; please reconsult PRN    Please call IR at extension [phone] with any questions, concerns, or issues regarding above.        Barbie Tenorio, [facility] Goldfield Medical CenterRAJIV-BC  Spectra # [phone]

## 2020-10-18 LAB
CULTURE RESULTS: SIGNIFICANT CHANGE UP
CULTURE RESULTS: SIGNIFICANT CHANGE UP
SPECIMEN SOURCE: SIGNIFICANT CHANGE UP
SPECIMEN SOURCE: SIGNIFICANT CHANGE UP

## 2020-12-07 ENCOUNTER — APPOINTMENT (OUTPATIENT)
Dept: UROLOGY | Facility: CLINIC | Age: 65
End: 2020-12-07

## 2021-01-14 ENCOUNTER — APPOINTMENT (OUTPATIENT)
Dept: UROLOGY | Facility: CLINIC | Age: 66
End: 2021-01-14
Payer: COMMERCIAL

## 2021-01-14 VITALS
DIASTOLIC BLOOD PRESSURE: 87 MMHG | HEART RATE: 71 BPM | BODY MASS INDEX: 22.5 KG/M2 | RESPIRATION RATE: 17 BRPM | WEIGHT: 140 LBS | HEIGHT: 66 IN | TEMPERATURE: 97 F | SYSTOLIC BLOOD PRESSURE: 155 MMHG

## 2021-01-14 PROCEDURE — 99214 OFFICE O/P EST MOD 30 MIN: CPT

## 2021-01-14 PROCEDURE — 99072 ADDL SUPL MATRL&STAF TM PHE: CPT

## 2021-01-14 NOTE — PHYSICAL EXAM
[General Appearance - Well Developed] : well developed [General Appearance - Well Nourished] : well nourished [Normal Appearance] : normal appearance [Well Groomed] : well groomed [General Appearance - In No Acute Distress] : no acute distress [Edema] : no peripheral edema [Respiration, Rhythm And Depth] : normal respiratory rhythm and effort [Exaggerated Use Of Accessory Muscles For Inspiration] : no accessory muscle use [Abdomen Soft] : soft [Abdomen Tenderness] : non-tender [Urinary Bladder Findings] : the bladder was normal on palpation [Costovertebral Angle Tenderness] : no ~M costovertebral angle tenderness [Normal Station and Gait] : the gait and station were normal for the patient's age [] : no rash [No Focal Deficits] : no focal deficits [Oriented To Time, Place, And Person] : oriented to person, place, and time [Affect] : the affect was normal [Mood] : the mood was normal [Not Anxious] : not anxious [No Palpable Adenopathy] : no palpable adenopathy

## 2021-01-15 NOTE — HISTORY OF PRESENT ILLNESS
[None] : None [FreeTextEntry1] : Mikaela Fields presents to the office today.  She is a 65-year-old woman who is here with her son for evaluation after undergoing a prior right open partial nephrectomy in Sept. 2020 at [facility].  After surgery, she had presented to [facility] with gross hematuria, right flank pain, and a large clot burden within the bladder.  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 CT for Oct also demonstrated left complex cyst at the upper pole of the left kidney with indeterminate features.\par \par The patient denies any current hematuria.  This did resolve after her embolization in October.  She denies flank pain, fever, chills, nausea or vomiting.  She does not have any lower urinary tract symptoms at this point.  It is unclear what the surgical pathology was from her prior partial nephrectomy.  The patient and her son both believe that the pathology was benign but they do not have any reports with them to document this and they seem a bit unsure of the findings reporting that the communication from their prior surgeon was not adequate.\par

## 2021-01-15 NOTE — ASSESSMENT
[FreeTextEntry1] : I reviewed the CT imaging that was done at the time of her presentation to [facility].  She has a large hematoma in the right retroperitoneum adjacent to the site of her kidney surgery.  There is also a delayed right nephrogram indicating likely obstruction of the right kidney related to clot burden passing through the collecting system.  The bladder also shows a large clot present.  I also reviewed the embolization films showing the procedure which did resolve her bleeding.\par \par In the contralateral kidney on the left, there was a complex lesion in the upper pole which may be solid or cystic, but the findings are a bit unclear.  I have recommended that the patient does have additional follow-up imaging post for the purposes of reevaluating the right kidney for resolution of the hematoma and to ensure that there is no evidence of any residual obstruction.  I have also recommended the MRI for the purposes of evaluating what appears to be an indeterminate lesion at the upper portion of the left kidney to ensure that there is no need for any further evaluation such as biopsy or even potential removal.\par \par I will be requesting the records from her prior surgery at [facility].  I will be in touch with her with those results as well as the results of the MRI once that has been completed.

## 2021-12-13 ENCOUNTER — APPOINTMENT (OUTPATIENT)
Dept: MRI IMAGING | Facility: IMAGING CENTER | Age: 66
End: 2021-12-13

## 2023-02-06 ENCOUNTER — APPOINTMENT (OUTPATIENT)
Dept: UROLOGY | Facility: CLINIC | Age: 68
End: 2023-02-06
Payer: MEDICARE

## 2023-02-06 VITALS
SYSTOLIC BLOOD PRESSURE: 147 MMHG | RESPIRATION RATE: 16 BRPM | HEIGHT: 66 IN | DIASTOLIC BLOOD PRESSURE: 84 MMHG | WEIGHT: 143 LBS | BODY MASS INDEX: 22.98 KG/M2 | HEART RATE: 86 BPM

## 2023-02-06 DIAGNOSIS — Z85.528 PERSONAL HISTORY OF OTHER MALIGNANT NEOPLASM OF KIDNEY: ICD-10-CM

## 2023-02-06 DIAGNOSIS — N28.1 CYST OF KIDNEY, ACQUIRED: ICD-10-CM

## 2023-02-06 PROCEDURE — 99213 OFFICE O/P EST LOW 20 MIN: CPT

## 2023-02-07 PROBLEM — N28.1 COMPLEX RENAL CYST: Status: ACTIVE | Noted: 2021-01-14

## 2023-02-07 NOTE — PHYSICAL EXAM
[General Appearance - Well Developed] : well developed [General Appearance - Well Nourished] : well nourished [Normal Appearance] : normal appearance [Well Groomed] : well groomed [General Appearance - In No Acute Distress] : no acute distress [Abdomen Soft] : soft [Abdomen Tenderness] : non-tender [Costovertebral Angle Tenderness] : no ~M costovertebral angle tenderness [FreeTextEntry1] : No hernia was seen on exam today.  She has a slight puckering of the skin across the surgical incision site but no palpable hernia or bulge is detected.  The skin is all intact.  There is no palpable fascial defect. [Urinary Bladder Findings] : the bladder was normal on palpation [Edema] : no peripheral edema [] : no respiratory distress [Respiration, Rhythm And Depth] : normal respiratory rhythm and effort [Exaggerated Use Of Accessory Muscles For Inspiration] : no accessory muscle use [Oriented To Time, Place, And Person] : oriented to person, place, and time [Affect] : the affect was normal [Mood] : the mood was normal [Not Anxious] : not anxious [Normal Station and Gait] : the gait and station were normal for the patient's age [No Focal Deficits] : no focal deficits [No Palpable Adenopathy] : no palpable adenopathy

## 2023-02-07 NOTE — HISTORY OF PRESENT ILLNESS
[FreeTextEntry1] : Mikaela Fields returns to the office today.  I had met her a little over 2 years ago after she had undergone a open right partial nephrectomy at [facility] with Dr. Tl Ferrell. The surgery had been done in September 2020.  She had presented after surgery to [facility] with gross hematuria and right flank pain as well as a large clot burden within her bladder.  She underwent selective renal artery embolization which did help to control bleeding which was coming from a pseudoaneurysm.  There had also been a cyst noted on her postoperative imaging which appeared to be of mild complexity at the left upper pole.  This was indeterminate and we had recommended that she have follow-up imaging either at [facility] or here with us.  She did not come for follow-up until now.  There has been no interval imaging done.\par \par The patient reports that she has been free of any hematuria.  She denies any recurrent clots and there is no lower urinary tract bother such as urgency or frequency and no incontinence.  She does feel as though she has a slight bulge in the area of her open partial nephrectomy incision site.\par

## 2023-02-07 NOTE — ASSESSMENT
[FreeTextEntry1] : The patient is overdue for follow-up imaging.  I will be recommending that she have an MRI of the abdomen with and without contrast for this imaging follow-up to help both evaluate the right kidney where she had the surgery performed to ensure that there is no evidence of locally recurrent disease and also to reevaluate the contralateral kidney where there was a complex cyst noted with indeterminate features and she does need recharacterize Tatian of this lesion to determine if there is any evidence of malignancy in the other kidney.\par \par Once the MRI is complete I will speak with her about those results by phone to give her any treatment or follow-up recommendations as indicated.\par \par I reassured her that she does not have a hernia but the appearance of the incision does show a slight puckering effect.

## 2023-02-23 ENCOUNTER — APPOINTMENT (OUTPATIENT)
Dept: MRI IMAGING | Facility: IMAGING CENTER | Age: 68
End: 2023-02-23

## 2023-03-28 ENCOUNTER — OUTPATIENT (OUTPATIENT)
Dept: OUTPATIENT SERVICES | Facility: HOSPITAL | Age: 68
LOS: 1 days | End: 2023-03-28
Payer: MEDICARE

## 2023-03-28 ENCOUNTER — APPOINTMENT (OUTPATIENT)
Dept: MRI IMAGING | Facility: IMAGING CENTER | Age: 68
End: 2023-03-28
Payer: MEDICARE

## 2023-03-28 DIAGNOSIS — Z85.528 PERSONAL HISTORY OF OTHER MALIGNANT NEOPLASM OF KIDNEY: ICD-10-CM

## 2023-03-28 PROCEDURE — 74183 MRI ABD W/O CNTR FLWD CNTR: CPT

## 2023-03-28 PROCEDURE — 74183 MRI ABD W/O CNTR FLWD CNTR: CPT | Mod: 26,MH

## 2023-03-28 PROCEDURE — A9585: CPT

## 2023-08-29 NOTE — ED ADULT NURSE NOTE - OBJECTIVE STATEMENT
Is stable, continue with current treatment.    64year old female coming in for hematuria, fever and chills x3 days. As per patient she had a liver biopsy preformed on 9/28, unknown results of biopsy. Pt began to experience chills three days ago she took her temperature and was febrile. Biopsy site in RLQ well approximated steri strips intact, no swelling or drainage noted. Pt also endorse urinary urgency. Pt is having trouble voiding while standing but has been urinating herself while laying down. Pt states she saw blood in the toilet after urination. Pt denies any nausea, vomiting or change in PO. Pt is nondiphtheritic, skin is normal for race.

## 2024-07-25 ENCOUNTER — EMERGENCY (EMERGENCY)
Facility: HOSPITAL | Age: 69
LOS: 1 days | Discharge: HOME | End: 2024-07-25
Attending: EMERGENCY MEDICINE | Admitting: EMERGENCY MEDICINE
Payer: MEDICARE

## 2024-07-25 VITALS
DIASTOLIC BLOOD PRESSURE: 76 MMHG | RESPIRATION RATE: 18 BRPM | HEART RATE: 60 BPM | SYSTOLIC BLOOD PRESSURE: 146 MMHG | OXYGEN SATURATION: 100 % | TEMPERATURE: 97 F

## 2024-07-25 VITALS
RESPIRATION RATE: 18 BRPM | TEMPERATURE: 98 F | DIASTOLIC BLOOD PRESSURE: 78 MMHG | OXYGEN SATURATION: 98 % | SYSTOLIC BLOOD PRESSURE: 153 MMHG | HEART RATE: 69 BPM | WEIGHT: 139.99 LBS

## 2024-07-25 LAB
ALBUMIN SERPL ELPH-MCNC: 4.7 G/DL — SIGNIFICANT CHANGE UP (ref 3.3–5)
ALP SERPL-CCNC: 101 U/L — SIGNIFICANT CHANGE UP (ref 40–120)
ALT FLD-CCNC: 21 U/L — SIGNIFICANT CHANGE UP (ref 4–33)
ANION GAP SERPL CALC-SCNC: 11 MMOL/L — SIGNIFICANT CHANGE UP (ref 7–14)
APPEARANCE UR: CLEAR — SIGNIFICANT CHANGE UP
AST SERPL-CCNC: 36 U/L — HIGH (ref 4–32)
BASOPHILS # BLD AUTO: 0.02 K/UL — SIGNIFICANT CHANGE UP (ref 0–0.2)
BASOPHILS NFR BLD AUTO: 0.3 % — SIGNIFICANT CHANGE UP (ref 0–2)
BILIRUB SERPL-MCNC: 0.4 MG/DL — SIGNIFICANT CHANGE UP (ref 0.2–1.2)
BILIRUB UR-MCNC: NEGATIVE — SIGNIFICANT CHANGE UP
BUN SERPL-MCNC: 23 MG/DL — SIGNIFICANT CHANGE UP (ref 7–23)
CALCIUM SERPL-MCNC: 9.8 MG/DL — SIGNIFICANT CHANGE UP (ref 8.4–10.5)
CHLORIDE SERPL-SCNC: 108 MMOL/L — HIGH (ref 98–107)
CO2 SERPL-SCNC: 26 MMOL/L — SIGNIFICANT CHANGE UP (ref 22–31)
COLOR SPEC: YELLOW — SIGNIFICANT CHANGE UP
CREAT SERPL-MCNC: 0.77 MG/DL — SIGNIFICANT CHANGE UP (ref 0.5–1.3)
DIFF PNL FLD: NEGATIVE — SIGNIFICANT CHANGE UP
EGFR: 84 ML/MIN/1.73M2 — SIGNIFICANT CHANGE UP
EOSINOPHIL # BLD AUTO: 0.07 K/UL — SIGNIFICANT CHANGE UP (ref 0–0.5)
EOSINOPHIL NFR BLD AUTO: 0.9 % — SIGNIFICANT CHANGE UP (ref 0–6)
GLUCOSE SERPL-MCNC: 114 MG/DL — HIGH (ref 70–99)
GLUCOSE UR QL: NEGATIVE MG/DL — SIGNIFICANT CHANGE UP
HCT VFR BLD CALC: 38.1 % — SIGNIFICANT CHANGE UP (ref 34.5–45)
HGB BLD-MCNC: 13 G/DL — SIGNIFICANT CHANGE UP (ref 11.5–15.5)
IANC: 3.16 K/UL — SIGNIFICANT CHANGE UP (ref 1.8–7.4)
IMM GRANULOCYTES NFR BLD AUTO: 0.1 % — SIGNIFICANT CHANGE UP (ref 0–0.9)
KETONES UR-MCNC: NEGATIVE MG/DL — SIGNIFICANT CHANGE UP
LEUKOCYTE ESTERASE UR-ACNC: NEGATIVE — SIGNIFICANT CHANGE UP
LYMPHOCYTES # BLD AUTO: 3.54 K/UL — HIGH (ref 1–3.3)
LYMPHOCYTES # BLD AUTO: 47.3 % — HIGH (ref 13–44)
MCHC RBC-ENTMCNC: 31.8 PG — SIGNIFICANT CHANGE UP (ref 27–34)
MCHC RBC-ENTMCNC: 34.1 GM/DL — SIGNIFICANT CHANGE UP (ref 32–36)
MCV RBC AUTO: 93.2 FL — SIGNIFICANT CHANGE UP (ref 80–100)
MONOCYTES # BLD AUTO: 0.69 K/UL — SIGNIFICANT CHANGE UP (ref 0–0.9)
MONOCYTES NFR BLD AUTO: 9.2 % — SIGNIFICANT CHANGE UP (ref 2–14)
NEUTROPHILS # BLD AUTO: 3.16 K/UL — SIGNIFICANT CHANGE UP (ref 1.8–7.4)
NEUTROPHILS NFR BLD AUTO: 42.2 % — LOW (ref 43–77)
NITRITE UR-MCNC: NEGATIVE — SIGNIFICANT CHANGE UP
NRBC # BLD: 0 /100 WBCS — SIGNIFICANT CHANGE UP (ref 0–0)
NRBC # FLD: 0 K/UL — SIGNIFICANT CHANGE UP (ref 0–0)
PH UR: 6.5 — SIGNIFICANT CHANGE UP (ref 5–8)
PLATELET # BLD AUTO: 216 K/UL — SIGNIFICANT CHANGE UP (ref 150–400)
POTASSIUM SERPL-MCNC: 4.4 MMOL/L — SIGNIFICANT CHANGE UP (ref 3.5–5.3)
POTASSIUM SERPL-SCNC: 4.4 MMOL/L — SIGNIFICANT CHANGE UP (ref 3.5–5.3)
PROT SERPL-MCNC: 7.5 G/DL — SIGNIFICANT CHANGE UP (ref 6–8.3)
PROT UR-MCNC: SIGNIFICANT CHANGE UP MG/DL
RBC # BLD: 4.09 M/UL — SIGNIFICANT CHANGE UP (ref 3.8–5.2)
RBC # FLD: 12 % — SIGNIFICANT CHANGE UP (ref 10.3–14.5)
SODIUM SERPL-SCNC: 145 MMOL/L — SIGNIFICANT CHANGE UP (ref 135–145)
SP GR SPEC: 1.02 — SIGNIFICANT CHANGE UP (ref 1–1.03)
UROBILINOGEN FLD QL: 1 MG/DL — SIGNIFICANT CHANGE UP (ref 0.2–1)
WBC # BLD: 7.49 K/UL — SIGNIFICANT CHANGE UP (ref 3.8–10.5)
WBC # FLD AUTO: 7.49 K/UL — SIGNIFICANT CHANGE UP (ref 3.8–10.5)

## 2024-07-25 PROCEDURE — 99284 EMERGENCY DEPT VISIT MOD MDM: CPT

## 2024-07-25 NOTE — ED PROVIDER NOTE - PATIENT PORTAL LINK FT
You can access the FollowMyHealth Patient Portal offered by [facility] by registering at the following website: [email or web address]. By joining [facility]’s FollowMyHealth portal, you will also be able to view your health information using other applications (apps) compatible with our system.

## 2024-07-25 NOTE — ED PROVIDER NOTE - PROGRESS NOTE DETAILS
Nicho THACKER, PGY-2;  Bladder scan preformed, at this time <100cc fluid in bladder. Nicho THACKER, PGY-2;  Review of CMP, normal BUN/Cr, will d/c with reccomended f/u with PCP.

## 2024-07-25 NOTE — ED PROVIDER NOTE - OBJECTIVE STATEMENT
67 y/o F s/p Partial R nephrectomy, HTN, HLD presenting with urinary retention. Patient sates over the last day has urinary urgency, without ability to fully empty bladder. Denies fever, chills, body aches, dysuria, hematuria, nausea, vomiting.

## 2024-07-25 NOTE — ED ADULT NURSE NOTE - IS THE PATIENT ABLE TO BE SCREENED?
"  Physical Therapy Daily Treatment Note     Name: Jackie Vanessa  Clinic Number: [id number]    Therapy Diagnosis:   Encounter Diagnoses   Name Primary?    Gait instability Yes    Muscle weakness      Physician: Marley Johnson NP    Visit Date: 10/28/2019    Physician Orders: PT Eval and Treat No inner ear problems. Balance Retraining therapy  Medical Diagnosis from Referral: Imbalance  Evaluation Date: 10/24/2019  Authorization Period Expiration: 12-31-19  Plan of Care Expiration: 12-19-19  Visit # / Visits authorized: 1 / 20 (+1 from prior authorization)  MD Follow up appointment: none scheduled     Time In: 2:03  Time Out: 2:55  Total Billable Time: 50 minutes     Precautions: Standard migraines, Graves disease    Subjective     Pt reports: overall feeling pretty good, but has some soreness into B hips and glutes.  She was compliant with home exercise program.  Response to previous treatment: muscle soreness  Functional change: none noted    Pain: 0/10  Location: bilateral LEs      Objective     Asiya received therapeutic exercises to develop strength, endurance, flexibility, posture and core stabilization for 30 minutes including:   Supine march with core tight x 10 each   Supine hip abd RTB with eccentric control into adduction x 10   Bridge with RTB for hip abd reese x 10  Leg lift prone alternate x 10  Hip ext prone with bent knee x 10      Asiya participated in neuromuscular re-education activities to improve: Balance, Coordination, Proprioception and Posture for 20 minutes. The following activities were included:   Standing march on airex foam x 20 with UE assist  Single leg balance x 30 sec - occasional LOB, CGA   Modified tandem stand x 30" each   Tandem balance x 20" each, more difficult with L LE posterior, CGA   Balance with narrow ZULMA and eyes closed x 30, CGA        Home Exercises Provided and Patient Education Provided     Education provided:   - education on performing exercises with range of the " muscles working to prevent compensation with other muscle groups  - rationale of proprioception and balance training for decreased shaking and jerking    Written Home Exercises Provided: yes. Pt also given red band for use with exercises at home  Exercises were reviewed and Asiya was able to demonstrate them prior to the end of the session.  Asiya demonstrated good  understanding of the education provided.     See EMR under Patient Instructions for exercises provided 10-24-19 and 10-28-19.    Assessment     Asiya tolerated treatment well this date overall. Noted fatigue of core and LEs with mat exercises however able to complete additional exercises with good technique. Difficulty performing single leg stance, sharp rhombeg (tandem), and narrow ZULMA with eyes closed due to impaired balance and significant quivering in attempt to maintain upright posture.  Asiya is progressing well towards her goals.   Pt prognosis is Good.     Pt will continue to benefit from skilled outpatient physical therapy to address the deficits listed in the problem list box on initial evaluation, provide pt/family education and to maximize pt's level of independence in the home and community environment.     Pt's spiritual, cultural and educational needs considered and pt agreeable to plan of care and goals.     Anticipated Barriers for therapy: Graves disease     Goals:   Short Term Goals:  4 weeks  Increase strength 1/2 muscle grade  Be able to perform HEP with minimal cueing required  Improve Romano Balance score  to 53     Long Term Goals: 8 weeks  Improve muscle strength 1 muscle grade  Restore ability to ambulate with normal gait pattern at all times  Walking for ADL and exercise will be restored without instability  Restore ability to stand for ADL without instability  Restore ability to perform ADL's and household activities independently and without instability  Improve Romano Balance score  to 54    Plan     Continue to progress B  hip strength as well as proprioception and balance with standing static and dynamic activities.    Paola Toscano, PTA    Yes

## 2024-07-25 NOTE — ED PROVIDER NOTE - CLINICAL SUMMARY MEDICAL DECISION MAKING FREE TEXT BOX
Dorina: A few days of feeling that, as soon as she empties her bladder, she has to go pee again. No F. No CVAT. No sensation of bladder or uterine prolapse. No dysuria. POCUS: no retention. Check UA. and Cr.
Spine appears normal, range of motion is not limited, no muscle or joint tenderness

## 2024-07-25 NOTE — ED ADULT NURSE NOTE - OBJECTIVE STATEMENT
Pt received in rm 29. C/o urinary retention x2 days states has urge to go to the bathroom and feeling of unable to fully empty bladder. Pt also has intermittent epigastric discomfort, none at this time. Hx HTN, HLD, partial R nephrectomy. Pt A&Ox4, ambulatory. Breathing even and unlabored. Denies dysuria, hematuria, back pain, nausea, vomiting, fever or chills. 20G IV placed to right AC. Labs drawn and urine sent.

## 2024-07-25 NOTE — ED PROVIDER NOTE - NSFOLLOWUPINSTRUCTIONS_ED_ALL_ED_FT
Today you were evaluated at [facility] ED for urinary retention.    While you were here we preformed blood work and urine studies.    The results of these studies are attached on the following pages.    Return to ED for fever, painful urination, chills, body aches.    Please follow up with your primary care provider in the next 2-4 days.

## 2024-07-26 NOTE — ED ADULT NURSE REASSESSMENT NOTE - NS ED NURSE REASSESS COMMENT FT1
Pt A&Ox4, ambulatory. Breathing even and unlabored. NAD. DC papers and instructions given by provider.